# Patient Record
Sex: FEMALE | Race: WHITE | NOT HISPANIC OR LATINO | Employment: OTHER
[De-identification: names, ages, dates, MRNs, and addresses within clinical notes are randomized per-mention and may not be internally consistent; named-entity substitution may affect disease eponyms.]

---

## 2022-05-09 ENCOUNTER — TRANSCRIBE ORDERS (OUTPATIENT)
Dept: OTHER | Age: 82
End: 2022-05-09

## 2022-05-09 DIAGNOSIS — M54.31 SCIATICA OF RIGHT SIDE: Primary | ICD-10-CM

## 2022-05-11 PROBLEM — M54.41 ACUTE RIGHT-SIDED LOW BACK PAIN WITH RIGHT-SIDED SCIATICA: Status: ACTIVE | Noted: 2022-05-11

## 2022-07-18 PROBLEM — M54.41 ACUTE RIGHT-SIDED LOW BACK PAIN WITH RIGHT-SIDED SCIATICA: Status: RESOLVED | Noted: 2022-05-11 | Resolved: 2022-07-18

## 2024-10-25 ENCOUNTER — OFFICE VISIT (OUTPATIENT)
Dept: URGENT CARE | Facility: URGENT CARE | Age: 84
End: 2024-10-25
Payer: MEDICARE

## 2024-10-25 ENCOUNTER — ANCILLARY PROCEDURE (OUTPATIENT)
Dept: GENERAL RADIOLOGY | Facility: CLINIC | Age: 84
End: 2024-10-25
Attending: PHYSICIAN ASSISTANT
Payer: MEDICARE

## 2024-10-25 VITALS
DIASTOLIC BLOOD PRESSURE: 75 MMHG | OXYGEN SATURATION: 96 % | HEIGHT: 65 IN | SYSTOLIC BLOOD PRESSURE: 138 MMHG | WEIGHT: 130 LBS | BODY MASS INDEX: 21.66 KG/M2 | TEMPERATURE: 98.2 F | HEART RATE: 83 BPM

## 2024-10-25 DIAGNOSIS — S89.91XA KNEE INJURY, RIGHT, INITIAL ENCOUNTER: Primary | ICD-10-CM

## 2024-10-25 DIAGNOSIS — R93.7 ABNORMAL X-RAY OF BONE: ICD-10-CM

## 2024-10-25 PROCEDURE — 99203 OFFICE O/P NEW LOW 30 MIN: CPT | Performed by: PHYSICIAN ASSISTANT

## 2024-10-25 PROCEDURE — 73562 X-RAY EXAM OF KNEE 3: CPT | Mod: TC | Performed by: RADIOLOGY

## 2024-10-25 RX ORDER — ACETAMINOPHEN 325 MG/1
325-650 TABLET ORAL EVERY 6 HOURS PRN
COMMUNITY

## 2024-10-25 ASSESSMENT — PAIN SCALES - GENERAL: PAINLEVEL_OUTOF10: MILD PAIN (3)

## 2024-10-25 NOTE — PROGRESS NOTES
Knee injury, right, initial encounter  - XR Knee Right 3 Views  - Ankle/Knee Bracing Supplies Order Knee Sleeve/Brace; Right; Open  - Orthopedic  Referral; Future    General Tips for All Ages:    R.I.C.E Method:  Why: This helps reduce swelling and promotes healing.  What to Do:  Rest: Allow the injured area to rest.  Ice: Apply an ice pack for 15-20 minutes every 2-3 hours.  Compression: Use a compression bandage to control swelling.  Elevation: Elevate the injured limb to reduce swelling.    Over-the-Counter (OTC) Pain Relievers:  Why: Manages pain and reduces inflammation.  What to Use:  All Ages: Acetaminophen or ibuprofen as directed on the package.    Avoid H.A.R.M:  Why: To prevent further injury.  What to Avoid:  Heat: Avoid heat packs initially.  Alcohol: Can increase swelling.  Running: Until you receive clearance from a healthcare provider.  Massage: Avoid direct pressure on the injured area.    For Adults (18 Years and Older):    Physical Therapy (if prescribed):  Why: Helps restore strength and flexibility.  What to Do: Follow the prescribed exercises and attend therapy sessions.    Bracing/Taping (if prescribed):  Why: Provides support during recovery.  What to Do: Use braces or tape as directed by your healthcare provider.    For Adolescents (12-17 Years):    OTC Pain Relievers:  Why: Manages pain and reduces inflammation.  What to Use: Acetaminophen or ibuprofen as directed on the package.    Physical Therapy (if prescribed):  Why: Aids in regaining strength and flexibility.  What to Do: Follow the exercises given by your physical therapist.    For Children (Under 12 Years):    Pediatrician Consultation:  Why: Children's injuries may need specialized care.  What to Do: Consult your child's pediatrician for guidance.    OTC Pain Relievers (if approved by pediatrician):  Why: Manages pain and reduces inflammation.  What to Use: Follow your pediatrician's advice on using acetaminophen or  ibuprofen.    All Ages:    Hydration and Nutrition:  Why: Essential for healing.  What to Do: Stay hydrated and ensure a balanced diet rich in vitamins and minerals.    Restorative Sleep:  Why: Crucial for recovery.  What to Do: Ensure you get adequate and quality sleep.  Follow-Up:    Patient advised to return to clinic for reevaluation (either UC or PCP) if symptoms do not improve in 7 days. Patient educated on red flag symptoms and asked to go directly to the ED if these symptoms present themselves.     Remember, individual cases may vary, and it's crucial to follow your healthcare provider's advice. If you have concerns or if symptoms persist, don't hesitate to reach out for further guidance.      Abnormal x-ray of bone  - Orthopedic  Referral; Future    I would like the patient to follow-up with orthopedics or her primary care provider in the next 1 to 2 weeks to monitor her calcified lesion.  May consider having advanced imaging if it appears that it is growing over time.    Kane Arizmendi PA-C  [facility]    Subjective   84 year old who presents to clinic today for the following health issues:    Urgent Care and Fall       HPI     Where in your body do you have pain? Musculoskeletal problem/pain  Onset/Duration: Pt in clinic to have eval for right knee pain and right elbow abrasion due to fall.  Description  Location: Right knee  Joint Swelling: YES  Redness: No  Pain: YES  Warmth: No  Intensity:  moderate  Progression of Symptoms:  same  Accompanying signs and symptoms:   Fevers: No  Numbness/tingling/weakness: No  History  Trauma to the area: YES  Recent illness:  No  Previous similar problem: No  Previous evaluation:  No  Precipitating or alleviating factors:  Aggravating factors include: none  Therapies tried and outcome: rest/inactivity and ice    Review of Systems   Review of Systems   See HPI    Objective    Temp: 98.2  F (36.8  C) Temp src: Temporal BP: 138/75 Pulse: 83      SpO2: 96 %       Physical Exam   Physical Exam  Constitutional:       General: She is not in acute distress.     Appearance: Normal appearance. She is normal weight. She is not ill-appearing, toxic-appearing or diaphoretic.   HENT:      Head: Normocephalic and atraumatic.   Cardiovascular:      Rate and Rhythm: Normal rate.      Pulses: Normal pulses.   Pulmonary:      Effort: Pulmonary effort is normal. No respiratory distress.   Musculoskeletal:        Legs:       Comments: Patient has a an abrasion in the area shown above that is healing well.  There is mild surrounding swelling but no erythema or bruising present.  There does not seem to be any deformity.  Patient has full range of motion but she is limping while ambulating.   Neurological:      General: No focal deficit present.      Mental Status: She is alert and oriented to person, place, and time. Mental status is at baseline.      Gait: Gait normal.   Psychiatric:         Mood and Affect: Mood normal.         Behavior: Behavior normal.         Thought Content: Thought content normal.         Judgment: Judgment normal.          Results for orders placed or performed in visit on 10/25/24 (from the past 24 hours)   XR Knee Right 3 Views    Narrative    EXAM: XR KNEE RIGHT 3 VIEWS  LOCATION: [facility] [place]  DATE: 10/25/2024    INDICATION: Pain since fall.  COMPARISON: None.      Impression    IMPRESSION:   1. Mildly displaced comminuted intra-articular fracture of the lateral aspect of the patella.  2. Moderate medial compartment osteoarthrosis.  3. Small calcified lesion in the proximal shaft of the tibia. 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